# Patient Record
Sex: MALE | Race: WHITE | NOT HISPANIC OR LATINO | Employment: OTHER | URBAN - NONMETROPOLITAN AREA
[De-identification: names, ages, dates, MRNs, and addresses within clinical notes are randomized per-mention and may not be internally consistent; named-entity substitution may affect disease eponyms.]

---

## 2024-03-13 PROBLEM — E78.5 HYPERLIPEMIA: Status: ACTIVE | Noted: 2024-03-13

## 2024-03-13 PROBLEM — I47.29 POLYMORPHIC VENTRICULAR TACHYCARDIA (MULTI): Status: ACTIVE | Noted: 2024-03-13

## 2024-03-13 PROBLEM — I42.8 NON-ISCHEMIC CARDIOMYOPATHY (MULTI): Status: ACTIVE | Noted: 2024-03-13

## 2024-03-13 PROBLEM — I25.10 CORONARY ARTERY DISEASE WITHOUT ANGINA PECTORIS: Status: ACTIVE | Noted: 2024-03-13

## 2024-03-13 PROBLEM — I46.9 CARDIAC ARREST (MULTI): Status: ACTIVE | Noted: 2024-03-13

## 2024-03-13 PROBLEM — I48.20 CHRONIC ATRIAL FIBRILLATION (MULTI): Status: ACTIVE | Noted: 2024-03-13

## 2024-03-13 PROBLEM — E03.9 HYPOTHYROIDISM: Status: ACTIVE | Noted: 2024-03-13

## 2024-03-13 PROBLEM — I35.0 AORTIC STENOSIS: Status: ACTIVE | Noted: 2024-03-13

## 2024-03-13 PROBLEM — N18.31 STAGE 3A CHRONIC KIDNEY DISEASE (MULTI): Status: ACTIVE | Noted: 2024-03-13

## 2024-03-13 PROBLEM — I10 HTN (HYPERTENSION): Status: ACTIVE | Noted: 2024-03-13

## 2024-03-13 PROBLEM — Z87.891 FORMER SMOKER: Status: ACTIVE | Noted: 2024-03-13

## 2024-03-13 PROBLEM — Z95.810 S/P IMPLANTATION OF AUTOMATIC CARDIOVERTER/DEFIBRILLATOR (AICD): Status: ACTIVE | Noted: 2024-03-13

## 2024-03-13 RX ORDER — DILTIAZEM HYDROCHLORIDE 180 MG/1
360 CAPSULE, EXTENDED RELEASE ORAL
COMMUNITY

## 2024-03-13 RX ORDER — LISINOPRIL 2.5 MG/1
1 TABLET ORAL DAILY
COMMUNITY

## 2024-03-13 RX ORDER — TRAMADOL HYDROCHLORIDE 50 MG/1
1 TABLET ORAL EVERY 8 HOURS PRN
COMMUNITY

## 2024-03-13 RX ORDER — FUROSEMIDE 40 MG/1
1 TABLET ORAL 2 TIMES DAILY
COMMUNITY

## 2024-03-13 RX ORDER — WARFARIN 4 MG/1
4 TABLET ORAL NIGHTLY
COMMUNITY

## 2024-03-13 RX ORDER — TERAZOSIN 10 MG/1
10 CAPSULE ORAL NIGHTLY
COMMUNITY

## 2024-03-13 RX ORDER — LEVOTHYROXINE SODIUM 75 UG/1
1 TABLET ORAL DAILY
COMMUNITY

## 2024-03-13 RX ORDER — ATORVASTATIN CALCIUM 10 MG/1
1 TABLET, FILM COATED ORAL NIGHTLY
COMMUNITY
End: 2024-05-15

## 2024-03-13 RX ORDER — METOPROLOL TARTRATE 100 MG/1
1 TABLET ORAL 2 TIMES DAILY
COMMUNITY

## 2024-03-13 RX ORDER — CALCIUM CARBONATE 300MG(750)
1 TABLET,CHEWABLE ORAL DAILY
COMMUNITY

## 2024-05-01 ENCOUNTER — TELEPHONE (OUTPATIENT)
Dept: CARDIOLOGY | Facility: CLINIC | Age: 84
End: 2024-05-01
Payer: MEDICARE

## 2024-05-01 DIAGNOSIS — Z95.810 S/P IMPLANTATION OF AUTOMATIC CARDIOVERTER/DEFIBRILLATOR (AICD): ICD-10-CM

## 2024-05-01 DIAGNOSIS — I48.20 CHRONIC ATRIAL FIBRILLATION (MULTI): ICD-10-CM

## 2024-05-01 NOTE — TELEPHONE ENCOUNTER
Phone call from [facility]. 5-1-2024 device analysis discussed. Patient is EOS. Patient is taking coumadin for chronic a-fib. TO Dr Ruano for review.     See device analysis

## 2024-05-02 NOTE — TELEPHONE ENCOUNTER
Phoned patient and discussed the above. Verbalized understanding. Dr. Christian Ruano MD to address coumadin hold. Scheduling to reach out today. Verbal consent given to Maday CARRERA LPN and Diamante ADHIKARI.

## 2024-05-07 ENCOUNTER — HOSPITAL ENCOUNTER (OUTPATIENT)
Dept: CARDIOLOGY | Facility: CLINIC | Age: 84
Discharge: HOME | End: 2024-05-07
Payer: MEDICARE

## 2024-05-07 VITALS
WEIGHT: 212 LBS | SYSTOLIC BLOOD PRESSURE: 110 MMHG | HEIGHT: 71 IN | BODY MASS INDEX: 29.68 KG/M2 | DIASTOLIC BLOOD PRESSURE: 66 MMHG

## 2024-05-07 DIAGNOSIS — I42.8 CARDIOMYOPATHY, NONISCHEMIC (MULTI): ICD-10-CM

## 2024-05-07 LAB
AORTIC VALVE MEAN GRADIENT: 17 MMHG
AORTIC VALVE PEAK VELOCITY: 2.88 M/S
AV PEAK GRADIENT: 33.2 MMHG
AVA (PEAK VEL): 1.03 CM2
AVA (VTI): 1.05 CM2
EJECTION FRACTION APICAL 4 CHAMBER: 41.3
LEFT VENTRICLE INTERNAL DIMENSION DIASTOLE: 4.52 CM (ref 3.5–6)
LEFT VENTRICULAR OUTFLOW TRACT DIAMETER: 2.1 CM
RIGHT VENTRICLE PEAK SYSTOLIC PRESSURE: 39.7 MMHG

## 2024-05-07 PROCEDURE — 93306 TTE W/DOPPLER COMPLETE: CPT | Performed by: INTERNAL MEDICINE

## 2024-05-07 PROCEDURE — 93306 TTE W/DOPPLER COMPLETE: CPT

## 2024-05-07 NOTE — TELEPHONE ENCOUNTER
Phoned patient with appointment details for gen change. Patient to hold coumadin 3 days prior to procedure. PST 5/8/2024 at 10 am and procedure 5/16/2024 at 12 pm both at [facility]. Discussed in detail about coumadin hold, fasting for appointments and answered all questions. Verbalized understanding.

## 2024-05-08 ENCOUNTER — TELEPHONE (OUTPATIENT)
Dept: CARDIOLOGY | Facility: CLINIC | Age: 84
End: 2024-05-08
Payer: MEDICARE

## 2024-05-08 LAB
NON-UH HIE ANION GAP: 7.4 (ref 6–15)
NON-UH HIE BASOPHILS # (AUTO): 0.1 10*3/UL (ref 0–0.2)
NON-UH HIE BASOPHILS % (AUTO): 0.7 %
NON-UH HIE BLOOD UREA NITROGEN: 19 MG/DL (ref 7–25)
NON-UH HIE CALCIUM: 8.6 MG/DL (ref 8.6–10.3)
NON-UH HIE CARBON DIOXIDE: 33.3 MMOL/L (ref 21–31)
NON-UH HIE CHLORIDE: 102 MMOL/L (ref 98–107)
NON-UH HIE CREATININE: 1.49 MG/DL (ref 0.7–1.3)
NON-UH HIE EOSINOPHILS # (AUTO): 0.1 10*3/UL (ref 0–0.45)
NON-UH HIE EOSINOPHILS % (AUTO): 1.2 %
NON-UH HIE ESTIMATED GFR: 46.57
NON-UH HIE GLUCOSE: 160 MG/DL (ref 70–100)
NON-UH HIE HEMATOCRIT: 36.4 % (ref 38.8–50)
NON-UH HIE HEMOGLOBIN: 12.1 G/DL (ref 13–17)
NON-UH HIE LYMPHOCYTES # (AUTO): 1.2 10*3/UL (ref 1–4.8)
NON-UH HIE LYMPHOCYTES % (AUTO): 15.9 %
NON-UH HIE MEAN CORPUSCULAR HEMOGLOBIN: 30.6 PG (ref 27.5–35.2)
NON-UH HIE MEAN CORPUSCULAR HGB CONC: 33.2 G/DL (ref 32.5–35.6)
NON-UH HIE MEAN CORPUSCULAR VOLUME: 92.2 FL (ref 83.5–101)
NON-UH HIE MEAN PLATELET VOLUME: 8.3 FL (ref 6.6–10.1)
NON-UH HIE MONOCYTES # (AUTO): 0.6 10*3/UL (ref 0–0.8)
NON-UH HIE MONOCYTES % (AUTO): 8.2 %
NON-UH HIE NEUTROPHILS # (AUTO): 5.4 10*3/UL (ref 1.8–7.7)
NON-UH HIE NEUTROPHILS % (AUTO): 74 %
NON-UH HIE NRBC%: 0.2 /100{WBC} (ref 0–0.5)
NON-UH HIE PLATELET COUNT: 144 10*3/UL (ref 150–450)
NON-UH HIE POTASSIUM: 3.7 MMOL/L (ref 3.5–5.1)
NON-UH HIE RED BLOOD COUNT: 3.95 (ref 3.9–5.6)
NON-UH HIE RED CELL DISTRIBUTION WIDTH: 14.9 % (ref 12–14.8)
NON-UH HIE SODIUM: 139 MMOL/L (ref 136–145)
NON-UH HIE UNCORRECTED WBC: 7.3 10*3/UL (ref 4.1–10.5)
NON-UH HIE WHITE BLOOD COUNT: 7.3 10*3/UL (ref 4.1–10.5)

## 2024-05-08 NOTE — TELEPHONE ENCOUNTER
GEN CHANGE/[id number] DX-I48.20, Z95.810 NO AUTH REQUIRED PER AUTOMATED CALL TO [email or web address].

## 2024-05-13 DIAGNOSIS — E78.2 MIXED HYPERLIPIDEMIA: ICD-10-CM

## 2024-05-15 ENCOUNTER — APPOINTMENT (OUTPATIENT)
Dept: CARDIOLOGY | Facility: CLINIC | Age: 84
End: 2024-05-15
Payer: MEDICARE

## 2024-05-15 RX ORDER — ATORVASTATIN CALCIUM 10 MG/1
10 TABLET, FILM COATED ORAL NIGHTLY
Qty: 90 TABLET | Refills: 0 | Status: SHIPPED | OUTPATIENT
Start: 2024-05-15

## 2024-05-16 LAB
NON-UH HIE ANION GAP: 10.9 (ref 6–15)
NON-UH HIE BASOPHILS # (AUTO): 0.1 10*3/UL (ref 0–0.2)
NON-UH HIE BASOPHILS % (AUTO): 0.8 %
NON-UH HIE BLOOD UREA NITROGEN: 21 MG/DL (ref 7–25)
NON-UH HIE CALCIUM: 9 MG/DL (ref 8.6–10.3)
NON-UH HIE CARBON DIOXIDE: 27.7 MMOL/L (ref 21–31)
NON-UH HIE CHLORIDE: 105 MMOL/L (ref 98–107)
NON-UH HIE CREATININE CLR CALC PHARMACY: 43.19
NON-UH HIE CREATININE: 1.5 MG/DL (ref 0.7–1.3)
NON-UH HIE EOSINOPHILS # (AUTO): 0.1 10*3/UL (ref 0–0.45)
NON-UH HIE EOSINOPHILS % (AUTO): 1.4 %
NON-UH HIE ESTIMATED GFR: 46.2
NON-UH HIE GLUCOSE: 167 MG/DL (ref 70–100)
NON-UH HIE HEMATOCRIT: 35.6 % (ref 38.8–50)
NON-UH HIE HEMOGLOBIN: 12 G/DL (ref 13–17)
NON-UH HIE INR: 1.9
NON-UH HIE LYMPHOCYTES # (AUTO): 1.3 10*3/UL (ref 1–4.8)
NON-UH HIE LYMPHOCYTES % (AUTO): 14.9 %
NON-UH HIE MEAN CORPUSCULAR HEMOGLOBIN: 31.1 PG (ref 27.5–35.2)
NON-UH HIE MEAN CORPUSCULAR HGB CONC: 33.7 G/DL (ref 32.5–35.6)
NON-UH HIE MEAN CORPUSCULAR VOLUME: 92.3 FL (ref 83.5–101)
NON-UH HIE MEAN PLATELET VOLUME: 7.9 FL (ref 6.6–10.1)
NON-UH HIE MONOCYTES # (AUTO): 0.6 10*3/UL (ref 0–0.8)
NON-UH HIE MONOCYTES % (AUTO): 7.5 %
NON-UH HIE NEUTROPHILS # (AUTO): 6.4 10*3/UL (ref 1.8–7.7)
NON-UH HIE NEUTROPHILS % (AUTO): 75.4 %
NON-UH HIE NRBC%: 0 /100{WBC} (ref 0–0.5)
NON-UH HIE PARTIAL THROMBOPLASTIN TIME: 40 S (ref 25.1–36.5)
NON-UH HIE PLATELET COUNT: 149 10*3/UL (ref 150–450)
NON-UH HIE POTASSIUM: 3.6 MMOL/L (ref 3.5–5.1)
NON-UH HIE PROTHROMBIN TIME: 21.5 S (ref 9–12.9)
NON-UH HIE RED BLOOD COUNT: 3.86 (ref 3.9–5.6)
NON-UH HIE RED CELL DISTRIBUTION WIDTH: 14.8 % (ref 12–14.8)
NON-UH HIE SODIUM: 140 MMOL/L (ref 136–145)
NON-UH HIE UNCORRECTED WBC: 8.5 10*3/UL (ref 4.1–10.5)
NON-UH HIE WHITE BLOOD COUNT: 8.5 10*3/UL (ref 4.1–10.5)

## 2024-05-23 ENCOUNTER — CLINICAL SUPPORT (OUTPATIENT)
Dept: CARDIOLOGY | Facility: CLINIC | Age: 84
End: 2024-05-23
Payer: MEDICARE

## 2024-05-23 VITALS
BODY MASS INDEX: 29.26 KG/M2 | TEMPERATURE: 98.2 F | DIASTOLIC BLOOD PRESSURE: 74 MMHG | SYSTOLIC BLOOD PRESSURE: 136 MMHG | HEART RATE: 72 BPM | WEIGHT: 209 LBS | HEIGHT: 71 IN

## 2024-05-23 DIAGNOSIS — I42.8 NON-ISCHEMIC CARDIOMYOPATHY (MULTI): ICD-10-CM

## 2024-05-23 DIAGNOSIS — I46.9 CARDIAC ARREST (MULTI): ICD-10-CM

## 2024-05-23 DIAGNOSIS — Z95.810 S/P IMPLANTATION OF AUTOMATIC CARDIOVERTER/DEFIBRILLATOR (AICD): ICD-10-CM

## 2024-05-23 DIAGNOSIS — I48.20 CHRONIC ATRIAL FIBRILLATION (MULTI): ICD-10-CM

## 2024-05-23 PROCEDURE — 99024 POSTOP FOLLOW-UP VISIT: CPT | Performed by: INTERNAL MEDICINE

## 2024-05-23 NOTE — PROGRESS NOTES
Patient here for at Wound check ordered by Dr. Ruano due to AICD EDWARD. Dr. Ruano in suite. Patient here due to Wound check s/p Generator change for AICD . Medication list Updated verbally.No cardiac complaints. Discussed with AMARJIT Lopez RN prior to discharge. Site is clear of signs of infections, no drainage noted, no steri-strips present. Site is well approximated. ATB completed. Mild Ecchymosis, no edema present.     To Dr. Ruano for review        There were no vitals filed for this visit.

## 2024-06-24 LAB
INR BLD: 2.7
PROTIME: NORMAL

## 2024-07-22 LAB
INR BLD: 2.8
PROTIME: NORMAL

## 2024-08-09 DIAGNOSIS — E78.2 MIXED HYPERLIPIDEMIA: ICD-10-CM

## 2024-08-10 RX ORDER — ATORVASTATIN CALCIUM 10 MG/1
10 TABLET, FILM COATED ORAL NIGHTLY
Qty: 90 TABLET | Refills: 3 | Status: SHIPPED | OUTPATIENT
Start: 2024-08-10 | End: 2025-08-10

## 2024-08-13 ENCOUNTER — APPOINTMENT (OUTPATIENT)
Dept: CARDIOLOGY | Facility: CLINIC | Age: 84
End: 2024-08-13
Payer: MEDICARE

## 2024-08-13 VITALS
DIASTOLIC BLOOD PRESSURE: 66 MMHG | BODY MASS INDEX: 29.35 KG/M2 | SYSTOLIC BLOOD PRESSURE: 118 MMHG | WEIGHT: 205 LBS | HEIGHT: 70 IN | HEART RATE: 68 BPM

## 2024-08-13 DIAGNOSIS — I46.9 CARDIAC ARREST (MULTI): ICD-10-CM

## 2024-08-13 DIAGNOSIS — I25.10 CORONARY ARTERY DISEASE INVOLVING NATIVE CORONARY ARTERY OF NATIVE HEART WITHOUT ANGINA PECTORIS: ICD-10-CM

## 2024-08-13 DIAGNOSIS — I47.29 POLYMORPHIC VENTRICULAR TACHYCARDIA (MULTI): ICD-10-CM

## 2024-08-13 DIAGNOSIS — I10 PRIMARY HYPERTENSION: ICD-10-CM

## 2024-08-13 DIAGNOSIS — I35.0 NONRHEUMATIC AORTIC VALVE STENOSIS: ICD-10-CM

## 2024-08-13 DIAGNOSIS — Z95.810 S/P IMPLANTATION OF AUTOMATIC CARDIOVERTER/DEFIBRILLATOR (AICD): ICD-10-CM

## 2024-08-13 DIAGNOSIS — I42.8 NON-ISCHEMIC CARDIOMYOPATHY (MULTI): Primary | ICD-10-CM

## 2024-08-13 DIAGNOSIS — I48.20 CHRONIC ATRIAL FIBRILLATION (MULTI): ICD-10-CM

## 2024-08-13 DIAGNOSIS — E78.2 MIXED HYPERLIPIDEMIA: ICD-10-CM

## 2024-08-13 DIAGNOSIS — Z87.891 FORMER SMOKER: ICD-10-CM

## 2024-08-13 RX ORDER — LISINOPRIL 2.5 MG/1
2.5 TABLET ORAL DAILY
Qty: 90 TABLET | Refills: 3 | Status: SHIPPED | OUTPATIENT
Start: 2024-08-13 | End: 2025-08-13

## 2024-08-13 NOTE — LETTER
August 13, 2024     Sarabjit Morrell DO  [address]  [facility]  [place] [address]    Patient: Brandon Masters   YOB: 1940   Date of Visit: 8/13/2024       Dear Dr. Sarabjit Morrell DO:    Thank you for referring Brandon Masters to me for evaluation. Below are my notes for this consultation.  If you have questions, please do not hesitate to call me. I look forward to following your patient along with you.       Sincerely,     Christian Ruano MD      CC: No Recipients  ______________________________________________________________________________________    Subjective   Brandon Masters is a 84 y.o. male       Chief Complaint    Follow-up          HPI       Patient is here for follow-up continue management for previous history of sudden cardiac death attributed to torsade related to proarrhythmic effect, chronic atrial fibrillation, mild aortic stenosis, mild nonischemic cardiomyopathy and mild to moderate coronary artery disease.  Since last time I saw him patient reports he is feeling well.  He denies chest pain, palpitation, lightheadedness, dizziness or syncope.  He recently underwent generator replacement without any cardiac issues or complication.  ASSESSMENT:      1. Status post remote sudden cardiac death due to torsade de pointes/ventricular tachycardia likely due to proarrhythmic effect of sotalol. He recovered completely and he had no recurrence of his ventricular tachycardia. Based on his recent device check  2. Documentation of very poor tolerance to antiarrhythmic including complication with both amiodarone and sotalol in the past, decision was made for long-term anticoagulation and heart rate controlled.  3. Chronic permanent atrial fibrillation. Rate controlled  4. Long-term anticoagulation with Coumadin. Well without side effects   5. Aortic stenosis, mild.  Based on recent echo  6. Mild atherosclerotic to moderate coronary artery disease affecting the LAD, PDA and  "marginal 1 coronary artery disease based on heart catheterization.  catheterization. Completely asymptomatic  7. Mild LV dysfunction, nonischemic.  Improved last ejection fraction is normal  8. Previous history of hypokalemia resolved  9. Status post AICD implant with good device function  10. Chronic kidney disease with creatinine around 1.5 followed by the renal service appears to be slightly better than before        1 RECOMMENDATION:      1. The patient will remain on current therapy.  But I advised him to resume low-dose lisinopril 2.5 mg daily  1 2. Reviewed his recent device check and recent lab work  3. I will see the patient back in the office in followup in 6 month  4. continue to encourage the patient to exercise and lose weight  5. Risk, benefit and alternative anticoagulation reviewed with patient at length he understood and agreed.  6.  I reviewed his recent echocardiogram     Review of Systems   All other systems reviewed and are negative.           Vitals:    08/13/24 1451   BP: 118/66   BP Location: Right arm   Patient Position: Sitting   Pulse: 68   Weight: 93 kg (205 lb)   Height: 1.778 m (5' 10\")        Objective   Physical Exam  Nursing note reviewed.   Constitutional:       Appearance: Normal appearance.   HENT:      Nose: Nose normal.   Neck:      Vascular: No carotid bruit.   Cardiovascular:      Rate and Rhythm: Normal rate.      Pulses: Normal pulses.      Heart sounds: Murmur heard.      Systolic murmur is present with a grade of 2/6.   Pulmonary:      Effort: Pulmonary effort is normal.   Abdominal:      General: Bowel sounds are normal.      Palpations: Abdomen is soft.   Musculoskeletal:         General: Normal range of motion.      Cervical back: Normal range of motion.      Right lower leg: No edema.      Left lower leg: No edema.   Skin:     General: Skin is warm and dry.   Neurological:      General: No focal deficit present.      Mental Status: He is alert.   Psychiatric:         " Mood and Affect: Mood normal.         Behavior: Behavior normal.         Thought Content: Thought content normal.         Judgment: Judgment normal.         Allergies  Beta-blockers (beta-adrenergic blocking agts)     Current Medications    Current Outpatient Medications:   •  atorvastatin (Lipitor) 10 mg tablet, Take 1 tablet (10 mg) by mouth once daily at bedtime., Disp: 90 tablet, Rfl: 3  •  dilTIAZem ER (Tiazac) 180 mg 24 hr capsule, Take 2 capsules (360 mg) by mouth once daily., Disp: , Rfl:   •  furosemide (Lasix) 40 mg tablet, Take 1 tablet (40 mg) by mouth once daily., Disp: , Rfl:   •  levothyroxine (Synthroid, Levoxyl) 75 mcg tablet, Take 1 tablet (75 mcg) by mouth once daily., Disp: , Rfl:   •  magnesium oxide (Mag-Ox) 400 mg tablet, Take 1 tablet (400 mg) by mouth once daily., Disp: , Rfl:   •  metoprolol tartrate (Lopressor) 100 mg tablet, Take 1 tablet (100 mg) by mouth 2 times a day., Disp: , Rfl:   •  terazosin (Hytrin) 10 mg capsule, Take 1 capsule (10 mg) by mouth once daily at bedtime., Disp: , Rfl:   •  traMADol (Ultram) 50 mg tablet, Take 1 tablet (50 mg) by mouth every 8 hours if needed., Disp: , Rfl:   •  warfarin (Coumadin) 4 mg tablet, Take 1 tablet (4 mg) by mouth once daily at bedtime. Take one tablet by mouth as directed by [facility], Disp: , Rfl:                      Assessment/Plan   1. Non-ischemic cardiomyopathy (Multi)        2. Chronic atrial fibrillation (Multi)  Follow Up In Cardiology      3. S/P implantation of automatic cardioverter/defibrillator (AICD)  Follow Up In Cardiology      4. Polymorphic ventricular tachycardia (Multi)        5. Mixed hyperlipidemia        6. Primary hypertension        7. Coronary artery disease involving native coronary artery of native heart without angina pectoris        8. Cardiac arrest (Multi)        9. Nonrheumatic aortic valve stenosis        10. Former smoker                 Scribe Attestation  By signing my name below, Rachel MCELROY  PETRA CASTILLO , Scribe   attest that this documentation has been prepared under the direction and in the presence of Christian Ruano MD.     Provider Attestation - Scribe documentation    All medical record entries made by the Scribe were at my direction and personally dictated by me. I have reviewed the chart and agree that the record accurately reflects my personal performance of the history, physical exam, discussion and plan.

## 2024-08-13 NOTE — PATIENT INSTRUCTIONS
Please bring all medicines, vitamins, and herbal supplements with you when you come to the office.    Prescriptions will not be filled unless you are compliant with your follow up appointments or have a follow up appointment scheduled as per instruction of your physician. Refills should be requested at the time of your visit.     BMI was above normal measurement. Current weight: 93 kg (205 lb)  Weight change since last visit (-) denotes wt loss -4 lbs   Weight loss needed to achieve BMI 25: 31.1 Lbs  Weight loss needed to achieve BMI 30: -3.6 Lbs  Provided instructions on dietary changes.

## 2024-08-13 NOTE — PROGRESS NOTES
Subjective   Brandon Masters is a 84 y.o. male       Chief Complaint    Follow-up          HPI       Patient is here for follow-up continue management for previous history of sudden cardiac death attributed to torsade related to proarrhythmic effect, chronic atrial fibrillation, mild aortic stenosis, mild nonischemic cardiomyopathy and mild to moderate coronary artery disease.  Since last time I saw him patient reports he is feeling well.  He denies chest pain, palpitation, lightheadedness, dizziness or syncope.  He recently underwent generator replacement without any cardiac issues or complication.  ASSESSMENT:      1. Status post remote sudden cardiac death due to torsade de pointes/ventricular tachycardia likely due to proarrhythmic effect of sotalol. He recovered completely and he had no recurrence of his ventricular tachycardia. Based on his recent device check  2. Documentation of very poor tolerance to antiarrhythmic including complication with both amiodarone and sotalol in the past, decision was made for long-term anticoagulation and heart rate controlled.  3. Chronic permanent atrial fibrillation. Rate controlled  4. Long-term anticoagulation with Coumadin. Well without side effects   5. Aortic stenosis, mild.  Based on recent echo  6. Mild atherosclerotic to moderate coronary artery disease affecting the LAD, PDA and marginal 1 coronary artery disease based on heart catheterization.  catheterization. Completely asymptomatic  7. Mild LV dysfunction, nonischemic.  Improved last ejection fraction is normal  8. Previous history of hypokalemia resolved  9. Status post AICD implant with good device function  10. Chronic kidney disease with creatinine around 1.5 followed by the renal service appears to be slightly better than before        1 RECOMMENDATION:      1. The patient will remain on current therapy.  But I advised him to resume low-dose lisinopril 2.5 mg daily  1 2. Reviewed his recent device check and  "recent lab work  3. I will see the patient back in the office in followup in 6 month  4. continue to encourage the patient to exercise and lose weight  5. Risk, benefit and alternative anticoagulation reviewed with patient at length he understood and agreed.  6.  I reviewed his recent echocardiogram     Review of Systems   All other systems reviewed and are negative.           Vitals:    08/13/24 1451   BP: 118/66   BP Location: Right arm   Patient Position: Sitting   Pulse: 68   Weight: 93 kg (205 lb)   Height: 1.778 m (5' 10\")        Objective   Physical Exam  Nursing note reviewed.   Constitutional:       Appearance: Normal appearance.   HENT:      Nose: Nose normal.   Neck:      Vascular: No carotid bruit.   Cardiovascular:      Rate and Rhythm: Normal rate.      Pulses: Normal pulses.      Heart sounds: Murmur heard.      Systolic murmur is present with a grade of 2/6.   Pulmonary:      Effort: Pulmonary effort is normal.   Abdominal:      General: Bowel sounds are normal.      Palpations: Abdomen is soft.   Musculoskeletal:         General: Normal range of motion.      Cervical back: Normal range of motion.      Right lower leg: No edema.      Left lower leg: No edema.   Skin:     General: Skin is warm and dry.   Neurological:      General: No focal deficit present.      Mental Status: He is alert.   Psychiatric:         Mood and Affect: Mood normal.         Behavior: Behavior normal.         Thought Content: Thought content normal.         Judgment: Judgment normal.         Allergies  Beta-blockers (beta-adrenergic blocking agts)     Current Medications    Current Outpatient Medications:     atorvastatin (Lipitor) 10 mg tablet, Take 1 tablet (10 mg) by mouth once daily at bedtime., Disp: 90 tablet, Rfl: 3    dilTIAZem ER (Tiazac) 180 mg 24 hr capsule, Take 2 capsules (360 mg) by mouth once daily., Disp: , Rfl:     furosemide (Lasix) 40 mg tablet, Take 1 tablet (40 mg) by mouth once daily., Disp: , Rfl:     " levothyroxine (Synthroid, Levoxyl) 75 mcg tablet, Take 1 tablet (75 mcg) by mouth once daily., Disp: , Rfl:     magnesium oxide (Mag-Ox) 400 mg tablet, Take 1 tablet (400 mg) by mouth once daily., Disp: , Rfl:     metoprolol tartrate (Lopressor) 100 mg tablet, Take 1 tablet (100 mg) by mouth 2 times a day., Disp: , Rfl:     terazosin (Hytrin) 10 mg capsule, Take 1 capsule (10 mg) by mouth once daily at bedtime., Disp: , Rfl:     traMADol (Ultram) 50 mg tablet, Take 1 tablet (50 mg) by mouth every 8 hours if needed., Disp: , Rfl:     warfarin (Coumadin) 4 mg tablet, Take 1 tablet (4 mg) by mouth once daily at bedtime. Take one tablet by mouth as directed by [facility], Disp: , Rfl:                      Assessment/Plan   1. Non-ischemic cardiomyopathy (Multi)        2. Chronic atrial fibrillation (Multi)  Follow Up In Cardiology      3. S/P implantation of automatic cardioverter/defibrillator (AICD)  Follow Up In Cardiology      4. Polymorphic ventricular tachycardia (Multi)        5. Mixed hyperlipidemia        6. Primary hypertension        7. Coronary artery disease involving native coronary artery of native heart without angina pectoris        8. Cardiac arrest (Multi)        9. Nonrheumatic aortic valve stenosis        10. Former smoker                 Scribe Attestation  By signing my name below, I, Reyna Robles LPN   attest that this documentation has been prepared under the direction and in the presence of Christian Ruano MD.     Provider Attestation - Scribe documentation    All medical record entries made by the Scribe were at my direction and personally dictated by me. I have reviewed the chart and agree that the record accurately reflects my personal performance of the history, physical exam, discussion and plan.

## 2024-08-19 LAB
INR BLD: 2.2
PROTIME: NORMAL

## 2024-09-16 LAB
INR BLD: 2.7
PROTIME: NORMAL

## 2024-10-14 LAB
INR BLD: 2.8
PROTIME: NORMAL

## 2024-11-11 LAB
INR BLD: 3.3
PROTIME: NORMAL

## 2024-11-25 LAB
INR BLD: 2.4
PROTIME: NORMAL

## 2024-12-16 LAB
INR BLD: 2.9
PROTIME: NORMAL

## 2025-01-10 ENCOUNTER — TELEPHONE (OUTPATIENT)
Dept: CARDIOLOGY | Facility: CLINIC | Age: 85
End: 2025-01-10
Payer: MEDICARE

## 2025-01-22 ENCOUNTER — TELEPHONE (OUTPATIENT)
Dept: CARDIOLOGY | Facility: CLINIC | Age: 85
End: 2025-01-22
Payer: MEDICARE

## 2025-01-22 NOTE — TELEPHONE ENCOUNTER
Patient's spouse phoned, will be moving in the near future to the [facility] area, would like a referral to [place] [place] [facility]. Inquiring if they would also need a referral to Coumadin Clinic in [place] or if they should have PCP take care of that.   Spouse also requested referral note, task in her chart.    Fax [phone]  Phone [phone]    To Dr. Christian Ruano MD for review.

## 2025-01-24 NOTE — TELEPHONE ENCOUNTER
Called and spoke with the patient and she states she received a call to schedule an appointment.    Daughter in law called stating the cardiologist in [place] requires a referral for an apt.     Cardiologist [place]  [facility]  [phone] phone  No doctor name given.    Patient is on coumadin. Advised to call [facility] for INR order until established in [place].     Phone daughter in law [phone]    To Dr. Christian Ruano MD

## 2025-01-27 LAB
INR BLD: 4
PROTIME: NORMAL

## 2025-01-28 ENCOUNTER — TELEPHONE (OUTPATIENT)
Age: 85
End: 2025-01-28

## 2025-01-28 DIAGNOSIS — I48.91 ATRIAL FIBRILLATION, UNSPECIFIED TYPE (HCC): Primary | ICD-10-CM

## 2025-02-04 PROBLEM — I48.91 A-FIB (HCC): Status: ACTIVE | Noted: 2025-02-04

## 2025-02-04 PROBLEM — I48.91 ATRIAL FIBRILLATION (HCC): Status: ACTIVE | Noted: 2025-02-04

## 2025-02-04 NOTE — TELEPHONE ENCOUNTER
Patient was a no call/no show for INR appointment today.  Called and spoke with Gisselle  who rescheduled for tomorrow (today's appt conflicted with another appt for a family member).        [Per dosing record from [facility], pt's INR was 4 on 1/28/25 due to not taking instructed regimen. He was instructed on 1/28 to hold warfarin 1 day then continue with 3.75mg MWF, 7.5mg all other days until INR appt here.]

## 2025-02-05 ENCOUNTER — ANTI-COAG VISIT (OUTPATIENT)
Age: 85
End: 2025-02-05

## 2025-02-05 DIAGNOSIS — I48.91 ATRIAL FIBRILLATION, UNSPECIFIED TYPE (HCC): Primary | ICD-10-CM

## 2025-02-05 LAB
INTERNATIONAL NORMALIZATION RATIO, POC: 3.2
PROTHROMBIN TIME, POC: 38

## 2025-02-05 PROCEDURE — 85610 PROTHROMBIN TIME: CPT

## 2025-02-05 PROCEDURE — 99212 OFFICE O/P EST SF 10 MIN: CPT

## 2025-02-05 RX ORDER — ATORVASTATIN CALCIUM 10 MG/1
10 TABLET, FILM COATED ORAL NIGHTLY
COMMUNITY
Start: 2024-08-10 | End: 2025-08-10

## 2025-02-05 RX ORDER — LEVOTHYROXINE SODIUM 75 UG/1
75 TABLET ORAL DAILY
COMMUNITY

## 2025-02-05 RX ORDER — METOPROLOL TARTRATE 100 MG/1
100 TABLET ORAL 2 TIMES DAILY
COMMUNITY

## 2025-02-05 RX ORDER — WARFARIN SODIUM 7.5 MG/1
7.5 TABLET ORAL DAILY
COMMUNITY

## 2025-02-05 RX ORDER — LISINOPRIL 2.5 MG/1
2.5 TABLET ORAL DAILY
COMMUNITY

## 2025-02-05 RX ORDER — FUROSEMIDE 40 MG/1
40 TABLET ORAL DAILY
COMMUNITY

## 2025-02-05 RX ORDER — MAGNESIUM OXIDE TAB 400 MG (241.3 MG ELEMENTAL MG) 400 (241.3 MG) MG
400 TAB ORAL DAILY
COMMUNITY
Start: 2024-12-05

## 2025-02-05 RX ORDER — DILTIAZEM HYDROCHLORIDE 180 MG/1
360 CAPSULE, COATED, EXTENDED RELEASE ORAL DAILY
COMMUNITY
Start: 2025-01-31

## 2025-02-05 RX ORDER — TRAMADOL HYDROCHLORIDE 50 MG/1
50 TABLET ORAL 3 TIMES DAILY PRN
COMMUNITY

## 2025-02-05 NOTE — PROGRESS NOTES
First visit to [facility] Office.  Patient has been on warfarin therapy for years and was previously managed by [facility] .    Patient seen in clinic for warfarin management due to atrial fibrillation with an INR goal of 2.0-3.0.  Estimated duration of therapy is indefinite. Education provided on indication and mechanism of warfarin, compliance, appropriate follow-up & monitoring, dietary and medication interactions, potential thromboembolic & bleeding complications, when to seek medical care, and office policy. Patient acknowledges working in consult agreement with pharmacist as referred by his/her physician.    Patient states compliant with regimen.  No bleeding or thromboembolic side effects noted.  No significant med or dietary changes.  No significant recent illness or disease state changes.  His INR was high last week and he states that he has been under increased stress due to process of moving.     PT/INR done in office per protocol.  INR is 3.2 which is just above goal.    Warfarin regimen will be continued at current dose 3.75mg Mon/Weds/Fri and 7.5mg all other days.  Will retest in 1 week.    Patient understands dosing directions and information discussed. Dosing schedule and follow up appointment given to patient.   Progress note routed to referring physicians office. Discussed with patient the Pharmacist Collaborative Practice Agreement.  Patient provided verbal and/or electronic (ex. [email or web address]) consent to participate in the collaborative practice agreement between the pharmacist and referred patient. This is in lieu of paper consent due to COVID-19 precautions and the use of remote/virtual visits.       For Pharmacy Admin Tracking Only    Intervention Detail:   Total # of Interventions Recommended: 0  Total # of Interventions Accepted: 0  Time Spent (min): 30

## 2025-02-12 ENCOUNTER — ANTI-COAG VISIT (OUTPATIENT)
Age: 85
End: 2025-02-12
Payer: MEDICARE

## 2025-02-12 ENCOUNTER — TELEPHONE (OUTPATIENT)
Dept: CARDIOLOGY | Facility: CLINIC | Age: 85
End: 2025-02-12
Payer: MEDICARE

## 2025-02-12 ENCOUNTER — APPOINTMENT (OUTPATIENT)
Age: 85
End: 2025-02-12
Payer: MEDICARE

## 2025-02-12 DIAGNOSIS — I48.91 ATRIAL FIBRILLATION, UNSPECIFIED TYPE (HCC): Primary | ICD-10-CM

## 2025-02-12 LAB
INTERNATIONAL NORMALIZATION RATIO, POC: 3.1
PROTHROMBIN TIME, POC: 37.2

## 2025-02-12 PROCEDURE — 85610 PROTHROMBIN TIME: CPT

## 2025-02-12 PROCEDURE — 99212 OFFICE O/P EST SF 10 MIN: CPT

## 2025-02-12 RX ORDER — TERAZOSIN 10 MG/1
10 CAPSULE ORAL NIGHTLY
COMMUNITY

## 2025-02-12 NOTE — PROGRESS NOTES
Patient seen in clinic for warfarin management due to atrial fibrillation with an INR goal of 2.0-3.0.  Estimated duration of therapy is indefinite.     Patient states compliant all of the time with regimen.  No bleeding or thromboembolic side effects noted.  No significant med or dietary changes.  No significant recent illness or disease state changes.      PT/INR done in office per protocol.  INR is 3.1 which is just above goal for second week in a row.     Warfarin regimen will be decreased to 7.5mg Sun/Tues/Thurs and 3.75mg all other days.  Will retest in 2 weeks.    Patient understands dosing directions and information discussed. Dosing schedule and follow up appointment given to patient.   Progress note routed to referring physicians office. Discussed with patient the Pharmacist Collaborative Practice Agreement.  Patient provided verbal and/or electronic (ex. [employer]) consent to participate in the collaborative practice agreement between the pharmacist and referred patient.     For Pharmacy Admin Tracking Only    Intervention Detail: Dose Adjustment: 1, reason: Therapy De-escalation  Total # of Interventions Recommended: 1  Total # of Interventions Accepted: 1  Time Spent (min): 15

## 2025-02-14 ENCOUNTER — APPOINTMENT (OUTPATIENT)
Dept: CARDIOLOGY | Facility: CLINIC | Age: 85
End: 2025-02-14
Payer: MEDICARE

## 2025-02-26 ENCOUNTER — ANTI-COAG VISIT (OUTPATIENT)
Age: 85
End: 2025-02-26
Payer: MEDICARE

## 2025-02-26 DIAGNOSIS — I48.91 ATRIAL FIBRILLATION, UNSPECIFIED TYPE (HCC): Primary | ICD-10-CM

## 2025-02-26 LAB
INTERNATIONAL NORMALIZATION RATIO, POC: 3.2
PROTHROMBIN TIME, POC: 38.7

## 2025-02-26 PROCEDURE — 99212 OFFICE O/P EST SF 10 MIN: CPT

## 2025-02-26 PROCEDURE — 85610 PROTHROMBIN TIME: CPT

## 2025-02-26 NOTE — PROGRESS NOTES
Patient seen in clinic for warfarin management due to atrial fibrillation with an INR goal of 2.0-3.0.  Estimated duration of therapy is indefinite.     Patient states compliant all of the time with regimen.  No bleeding or thromboembolic side effects noted.  No significant med or dietary changes.  No significant recent illness or disease state changes.      PT/INR done in office per protocol.  INR is 3.2 which is just above goal for no identifiable reason for 3rd time in a row.     Warfarin regimen will be decreased to 7.5mg Sun/Thurs and 3.75mg all other days.  Will retest in 2 weeks.    Patient understands dosing directions and information discussed. Dosing schedule and follow up appointment given to patient.   Progress note routed to referring physicians office. Discussed with patient the Pharmacist Collaborative Practice Agreement.  Patient provided verbal and/or electronic (ex. [employer]) consent to participate in the collaborative practice agreement between the pharmacist and referred patient.     For Pharmacy Admin Tracking Only    Intervention Detail: Dose Adjustment: 1, reason: Therapy De-escalation  Total # of Interventions Recommended: 1  Total # of Interventions Accepted: 1  Time Spent (min): 15

## 2025-03-10 ENCOUNTER — APPOINTMENT (OUTPATIENT)
Dept: CARDIOLOGY | Facility: CLINIC | Age: 85
End: 2025-03-10
Payer: MEDICARE

## 2025-03-12 ENCOUNTER — ANTI-COAG VISIT (OUTPATIENT)
Age: 85
End: 2025-03-12
Payer: MEDICARE

## 2025-03-12 DIAGNOSIS — I48.91 ATRIAL FIBRILLATION, UNSPECIFIED TYPE (HCC): Primary | ICD-10-CM

## 2025-03-12 LAB
INTERNATIONAL NORMALIZATION RATIO, POC: 2.6
PROTHROMBIN TIME, POC: 31

## 2025-03-12 PROCEDURE — 85610 PROTHROMBIN TIME: CPT

## 2025-03-12 PROCEDURE — 99211 OFF/OP EST MAY X REQ PHY/QHP: CPT

## 2025-03-12 NOTE — PROGRESS NOTES
Patient seen in clinic for warfarin management due to atrial fibrillation with an INR goal of 2.0-3.0.  Estimated duration of therapy is indefinite.     Patient states compliant all of the time with regimen.  No bleeding or thromboembolic side effects noted.  No significant med or dietary changes.  No significant recent illness or disease state changes.      PT/INR done in office per protocol.  INR is 2.6 which is therapeutic.     Warfarin regimen will be continued at current dose of 7.5mg Sun/Thurs and 3.75mg all other days.  Will retest in 3 weeks.    Patient understands dosing directions and information discussed. Dosing schedule and follow up appointment given to patient.   Progress note routed to referring physicians office. Discussed with patient the Pharmacist Collaborative Practice Agreement.  Patient provided verbal and/or electronic (ex. [employer]) consent to participate in the collaborative practice agreement between the pharmacist and referred patient.     For Pharmacy Admin Tracking Only    Intervention Detail:   Total # of Interventions Recommended: 0  Total # of Interventions Accepted: 0  Time Spent (min): 15

## 2025-04-02 ENCOUNTER — ANTI-COAG VISIT (OUTPATIENT)
Age: 85
End: 2025-04-02
Payer: MEDICARE

## 2025-04-02 ENCOUNTER — APPOINTMENT (OUTPATIENT)
Age: 85
End: 2025-04-02
Payer: MEDICARE

## 2025-04-02 DIAGNOSIS — I48.91 ATRIAL FIBRILLATION, UNSPECIFIED TYPE (HCC): Primary | ICD-10-CM

## 2025-04-02 LAB
INR BLD: 2.4
PROTIME: 28.6

## 2025-04-02 PROCEDURE — 99211 OFF/OP EST MAY X REQ PHY/QHP: CPT

## 2025-04-02 PROCEDURE — 85610 PROTHROMBIN TIME: CPT

## 2025-04-02 NOTE — PROGRESS NOTES
Patient seen in clinic for warfarin management due to atrial fibrillation with an INR goal of 2.0-3.0.  Estimated duration of therapy is indefinite.     Patient states compliant all of the time with regimen.  No bleeding or thromboembolic side effects noted.  No significant med or dietary changes.  No significant recent illness or disease state changes.      PT/INR done in office per protocol.  INR is 2.4 which is therapeutic.     Warfarin regimen will be continued at current dose 7.5 mg Sunday and Thursday; 3.75 mg all other days.  Will retest in 4 weeks.    Patient understands dosing directions and information discussed. Dosing schedule and follow up appointment given to patient.   Progress note routed to referring physicians office. Discussed with patient the Pharmacist Collaborative Practice Agreement.  Patient provided verbal and/or electronic (ex. [employer]) consent to participate in the collaborative practice agreement between the pharmacist and referred patient.     For Pharmacy Admin Tracking Only    Intervention Detail:   Total # of Interventions Recommended: 0  Total # of Interventions Accepted: 0  Time Spent (min): 15

## 2025-04-30 ENCOUNTER — ANTI-COAG VISIT (OUTPATIENT)
Age: 85
End: 2025-04-30
Payer: MEDICARE

## 2025-04-30 DIAGNOSIS — I48.91 ATRIAL FIBRILLATION, UNSPECIFIED TYPE (HCC): Primary | ICD-10-CM

## 2025-04-30 LAB
INTERNATIONAL NORMALIZATION RATIO, POC: 2.4
PROTHROMBIN TIME, POC: 28.5

## 2025-04-30 PROCEDURE — 99211 OFF/OP EST MAY X REQ PHY/QHP: CPT | Performed by: PHARMACIST

## 2025-04-30 PROCEDURE — 85610 PROTHROMBIN TIME: CPT | Performed by: PHARMACIST

## 2025-04-30 NOTE — PROGRESS NOTES
Patient seen in clinic for warfarin management due to atrial fibrillation with an INR goal of 2.0-3.0.  Estimated duration of therapy is indefinite.     Patient states compliant all of the time with regimen.  No bleeding or thromboembolic side effects noted.  No significant med or dietary changes.  No significant recent illness or disease state changes.      PT/INR done in office per protocol.  INR is 2.4 which is therapeutic.     Warfarin regimen will be continued at current dose 7.5mg Sun/Thurs, 3.75mg all other days.  Will retest in 4 weeks.    Patient understands dosing directions and information discussed. Dosing schedule and follow up appointment given to patient.   Progress note routed to referring physicians office. Discussed with patient the Pharmacist Collaborative Practice Agreement.  Patient provided verbal and/or electronic (ex. [employer]) consent to participate in the collaborative practice agreement between the pharmacist and referred patient.     For Pharmacy Admin Tracking Only    Intervention Detail:   Total # of Interventions Recommended: 0  Total # of Interventions Accepted: 0  Time Spent (min): 15

## 2025-05-28 ENCOUNTER — ANTI-COAG VISIT (OUTPATIENT)
Age: 85
End: 2025-05-28
Payer: MEDICARE

## 2025-05-28 DIAGNOSIS — I48.91 ATRIAL FIBRILLATION, UNSPECIFIED TYPE (HCC): Primary | ICD-10-CM

## 2025-05-28 LAB
INTERNATIONAL NORMALIZATION RATIO, POC: 2.3
PROTHROMBIN TIME, POC: 27.9

## 2025-05-28 PROCEDURE — 99211 OFF/OP EST MAY X REQ PHY/QHP: CPT

## 2025-05-28 PROCEDURE — 85610 PROTHROMBIN TIME: CPT

## 2025-05-28 NOTE — PROGRESS NOTES
Patient seen in clinic for warfarin management due to atrial fibrillation with an INR goal of 2.0-3.0.  Estimated duration of therapy is indefinite.     Patient states compliant all of the time with regimen.  No bleeding or thromboembolic side effects noted.  No significant med or dietary changes.  No significant recent illness or disease state changes.  Patient has upcoming eye surgery, which has not yet been scheduled.     PT/INR done in office per protocol.  INR is 2.3 which is therapeutic.     Warfarin regimen will be continued at current dose of 7.5mg Sun/Thurs and 3.75mg all other days.  Will retest in 4 weeks.    Patient understands dosing directions and information discussed. Dosing schedule and follow up appointment given to patient.   Progress note routed to referring physicians office. Discussed with patient the Pharmacist Collaborative Practice Agreement.  Patient provided verbal and/or electronic (ex. [employer]) consent to participate in the collaborative practice agreement between the pharmacist and referred patient.     For Pharmacy Admin Tracking Only    Intervention Detail:   Total # of Interventions Recommended: 0  Total # of Interventions Accepted: 0  Time Spent (min): 15

## 2025-06-09 ENCOUNTER — TELEPHONE (OUTPATIENT)
Age: 85
End: 2025-06-09

## 2025-06-25 ENCOUNTER — ANTI-COAG VISIT (OUTPATIENT)
Age: 85
End: 2025-06-25
Payer: MEDICARE

## 2025-06-25 DIAGNOSIS — I48.91 ATRIAL FIBRILLATION, UNSPECIFIED TYPE (HCC): Primary | ICD-10-CM

## 2025-06-25 LAB
INTERNATIONAL NORMALIZATION RATIO, POC: 2.4
PROTHROMBIN TIME, POC: 0

## 2025-06-25 PROCEDURE — 99211 OFF/OP EST MAY X REQ PHY/QHP: CPT

## 2025-06-25 PROCEDURE — 85610 PROTHROMBIN TIME: CPT

## 2025-06-25 RX ORDER — ERYTHROMYCIN 5 MG/G
0.5 OINTMENT OPHTHALMIC NIGHTLY PRN
COMMUNITY
Start: 2025-06-12

## 2025-06-25 NOTE — PROGRESS NOTES
Patient seen in clinic for warfarin management due to atrial fibrillation with an INR goal of 2.0-3.0.  Estimated duration of therapy is indefinite.     Patient states compliant all of the time with regimen.  No bleeding or thromboembolic side effects noted.  No significant med or dietary changes.  No significant recent illness or disease state changes.      PT/INR done in office per protocol.  INR is 2.4 which is therapeutic.     Warfarin regimen will be continued at current dose of 7.5mg every Sun/Thurs and 3.75mg all other days.  Will retest in 4 weeks.    Patient understands dosing directions and information discussed. Dosing schedule and follow up appointment given to patient.   Progress note routed to referring physicians office. Discussed with patient the Pharmacist Collaborative Practice Agreement.  Patient provided verbal and/or electronic (ex. [employer]) consent to participate in the collaborative practice agreement between the pharmacist and referred patient.     For Pharmacy Admin Tracking Only    Intervention Detail:   Total # of Interventions Recommended: 0  Total # of Interventions Accepted: 0  Time Spent (min): 15

## 2025-07-23 ENCOUNTER — TELEPHONE (OUTPATIENT)
Age: 85
End: 2025-07-23

## 2025-07-23 ENCOUNTER — ANTI-COAG VISIT (OUTPATIENT)
Age: 85
End: 2025-07-23
Payer: MEDICARE

## 2025-07-23 DIAGNOSIS — I48.91 ATRIAL FIBRILLATION, UNSPECIFIED TYPE (HCC): Primary | ICD-10-CM

## 2025-07-23 LAB
INTERNATIONAL NORMALIZATION RATIO, POC: 2.8
PROTHROMBIN TIME, POC: 0

## 2025-07-23 PROCEDURE — 99211 OFF/OP EST MAY X REQ PHY/QHP: CPT

## 2025-07-23 PROCEDURE — 85610 PROTHROMBIN TIME: CPT

## 2025-07-23 NOTE — PROGRESS NOTES
Patient seen in clinic for warfarin management due to atrial fibrillation with an INR goal of 2.0-3.0.  Estimated duration of therapy is indefinite.     Patient states compliant all of the time with regimen.  No bleeding or thromboembolic side effects noted.  No significant med or dietary changes.  No significant recent illness or disease state changes.      PT/INR done in office per protocol.  INR is 2.8 which is therapeutic.     Warfarin regimen will be continued at current dose 7.5mg every Sun/Thurs and 3.75mg all other days.  Will retest in 6 weeks.    Patient understands dosing directions and information discussed. Dosing schedule and follow up appointment given to patient.   Progress note routed to referring physicians office. Discussed with patient the Pharmacist Collaborative Practice Agreement.  Patient provided verbal and/or electronic (ex. [employer]) consent to participate in the collaborative practice agreement between the pharmacist and referred patient.     For Pharmacy Admin Tracking Only    Intervention Detail:   Total # of Interventions Recommended: 0  Total # of Interventions Accepted: 0  Time Spent (min): 15

## 2025-07-23 NOTE — TELEPHONE ENCOUNTER
Patient no-showed today's appointment; appointment was for INR. Left message requesting a call back to reschedule.

## 2025-08-28 DIAGNOSIS — I48.91 ATRIAL FIBRILLATION, UNSPECIFIED TYPE (HCC): Primary | ICD-10-CM

## 2025-08-28 RX ORDER — WARFARIN SODIUM 7.5 MG/1
7.5 TABLET ORAL DAILY
Qty: 90 TABLET | Refills: 1 | OUTPATIENT
Start: 2025-08-28

## 2025-09-03 ENCOUNTER — ANTI-COAG VISIT (OUTPATIENT)
Age: 85
End: 2025-09-03
Payer: MEDICARE

## 2025-09-03 DIAGNOSIS — I48.91 ATRIAL FIBRILLATION, UNSPECIFIED TYPE (HCC): Primary | ICD-10-CM

## 2025-09-03 LAB
INTERNATIONAL NORMALIZATION RATIO, POC: 2.4
PROTHROMBIN TIME, POC: NORMAL

## 2025-09-03 PROCEDURE — 85610 PROTHROMBIN TIME: CPT

## 2025-09-03 PROCEDURE — 99211 OFF/OP EST MAY X REQ PHY/QHP: CPT

## 2025-09-03 RX ORDER — DILTIAZEM HYDROCHLORIDE EXTENDED-RELEASE TABLETS 180 MG/1
2 TABLET, EXTENDED RELEASE ORAL DAILY
COMMUNITY
Start: 2025-09-03